# Patient Record
Sex: MALE | URBAN - NONMETROPOLITAN AREA
[De-identification: names, ages, dates, MRNs, and addresses within clinical notes are randomized per-mention and may not be internally consistent; named-entity substitution may affect disease eponyms.]

---

## 2023-07-13 ENCOUNTER — APPOINTMENT (RX ONLY)
Dept: URBAN - NONMETROPOLITAN AREA CLINIC 1 | Facility: CLINIC | Age: 53
Setting detail: DERMATOLOGY
End: 2023-07-13

## 2023-07-13 DIAGNOSIS — L21.8 OTHER SEBORRHEIC DERMATITIS: ICD-10-CM | Status: INADEQUATELY CONTROLLED

## 2023-07-13 DIAGNOSIS — L82.0 INFLAMED SEBORRHEIC KERATOSIS: ICD-10-CM | Status: INADEQUATELY CONTROLLED

## 2023-07-13 PROCEDURE — 17110 DESTRUCTION B9 LES UP TO 14: CPT

## 2023-07-13 PROCEDURE — ? MEDICATION COUNSELING

## 2023-07-13 PROCEDURE — 99204 OFFICE O/P NEW MOD 45 MIN: CPT | Mod: 25

## 2023-07-13 PROCEDURE — ? LIQUID NITROGEN

## 2023-07-13 PROCEDURE — ? PRESCRIPTION MEDICATION MANAGEMENT

## 2023-07-13 PROCEDURE — ? PRESCRIPTION

## 2023-07-13 PROCEDURE — ? COUNSELING

## 2023-07-13 RX ORDER — FLUOCINOLONE ACETONIDE 0.1 MG/ML
SOLUTION TOPICAL AS DIRECTED
Qty: 60 | Refills: 3 | Status: ERX | COMMUNITY
Start: 2023-07-13

## 2023-07-13 RX ORDER — KETOCONAZOLE 20 MG/ML
SHAMPOO, SUSPENSION TOPICAL AS DIRECTED
Qty: 120 | Refills: 3 | Status: ERX | COMMUNITY
Start: 2023-07-13

## 2023-07-13 RX ADMIN — FLUOCINOLONE ACETONIDE: 0.1 SOLUTION TOPICAL at 00:00

## 2023-07-13 RX ADMIN — KETOCONAZOLE: 20 SHAMPOO, SUSPENSION TOPICAL at 00:00

## 2023-07-13 ASSESSMENT — LOCATION DETAILED DESCRIPTION DERM
LOCATION DETAILED: RIGHT CENTRAL FRONTAL SCALP
LOCATION DETAILED: LEFT CENTRAL FRONTAL SCALP
LOCATION DETAILED: MID-OCCIPITAL SCALP

## 2023-07-13 ASSESSMENT — LOCATION ZONE DERM: LOCATION ZONE: SCALP

## 2023-07-13 ASSESSMENT — LOCATION SIMPLE DESCRIPTION DERM
LOCATION SIMPLE: RIGHT SCALP
LOCATION SIMPLE: LEFT SCALP
LOCATION SIMPLE: POSTERIOR SCALP

## 2023-07-13 NOTE — PROCEDURE: MEDICATION COUNSELING
Azithromycin Counseling:  I discussed with the patient the risks of azithromycin including but not limited to GI upset, allergic reaction, drug rash, diarrhea, and yeast infections. Nsaids Counseling: NSAID Counseling: I discussed with the patient that NSAIDs should be taken with food. Prolonged use of NSAIDs can result in the development of stomach ulcers.  Patient advised to stop taking NSAIDs if abdominal pain occurs.  The patient verbalized understanding of the proper use and possible adverse effects of NSAIDs.  All of the patient's questions and concerns were addressed.

## 2023-07-13 NOTE — PROCEDURE: PRESCRIPTION MEDICATION MANAGEMENT
Render In Strict Bullet Format?: No
Detail Level: Zone
Initiate Treatment: Ketoconazole shampoo apply to scalp once weekly leave on for 5 mins then rinse \\nFluocinolone solution apply to scalp bid for 2 weeks then bid weekends only

## 2023-07-13 NOTE — PROCEDURE: MEDICATION COUNSELING
Not Done Clofazimine Counseling:  I discussed with the patient the risks of clofazimine including but not limited to skin and eye pigmentation, liver damage, nausea/vomiting, gastrointestinal bleeding and allergy.

## 2023-07-13 NOTE — PROCEDURE: LIQUID NITROGEN
Include Z78.9 (Other Specified Conditions Influencing Health Status) As An Associated Diagnosis?: No
Post-Care Instructions: I reviewed with the patient in detail post-care instructions. Patient is to wear sunprotection, and avoid picking at any of the treated lesions. Pt may apply Vaseline to crusted or scabbing areas.
Show Topical Anesthesia Variable?: Yes
Spray Paint Text: The liquid nitrogen was applied to the skin utilizing a spray paint frosting technique.
Medical Necessity Clause: This procedure was medically necessary because the lesions that were treated were:
Medical Necessity Information: It is in your best interest to select a reason for this procedure from the list below. All of these items fulfill various CMS LCD requirements except the new and changing color options.
Detail Level: Detailed
Consent: The patient's consent was obtained including but not limited to risks of crusting, scabbing, blistering, scarring, darker or lighter pigmentary change, recurrence, incomplete removal and infection.

## 2023-07-13 NOTE — PROCEDURE: MEDICATION COUNSELING
naomi flores Propranolol Counseling:  I discussed with the patient the risks of propranolol including but not limited to low heart rate, low blood pressure, low blood sugar, restlessness and increased cold sensitivity. They should call the office if they experience any of these side effects.

## 2023-08-24 ENCOUNTER — APPOINTMENT (RX ONLY)
Dept: URBAN - NONMETROPOLITAN AREA CLINIC 1 | Facility: CLINIC | Age: 53
Setting detail: DERMATOLOGY
End: 2023-08-24

## 2023-08-24 ENCOUNTER — RX ONLY (OUTPATIENT)
Age: 53
Setting detail: RX ONLY
End: 2023-08-24

## 2023-08-24 DIAGNOSIS — L21.8 OTHER SEBORRHEIC DERMATITIS: ICD-10-CM

## 2023-08-24 DIAGNOSIS — L82.0 INFLAMED SEBORRHEIC KERATOSIS: ICD-10-CM | Status: INADEQUATELY CONTROLLED

## 2023-08-24 PROCEDURE — ? PRESCRIPTION MEDICATION MANAGEMENT

## 2023-08-24 PROCEDURE — 17110 DESTRUCTION B9 LES UP TO 14: CPT

## 2023-08-24 PROCEDURE — ? COUNSELING

## 2023-08-24 PROCEDURE — 99214 OFFICE O/P EST MOD 30 MIN: CPT | Mod: 25

## 2023-08-24 PROCEDURE — ? MEDICATION COUNSELING

## 2023-08-24 PROCEDURE — ? LIQUID NITROGEN

## 2023-08-24 RX ORDER — FLUOCINOLONE ACETONIDE 0.1 MG/ML
SOLUTION TOPICAL AS DIRECTED
Qty: 60 | Refills: 3 | Status: ERX

## 2023-08-24 ASSESSMENT — LOCATION DETAILED DESCRIPTION DERM
LOCATION DETAILED: RIGHT SUPERIOR PARIETAL SCALP
LOCATION DETAILED: LEFT SUPERIOR PARIETAL SCALP
LOCATION DETAILED: RIGHT CENTRAL PARIETAL SCALP

## 2023-08-24 ASSESSMENT — LOCATION SIMPLE DESCRIPTION DERM: LOCATION SIMPLE: SCALP

## 2023-08-24 ASSESSMENT — LOCATION ZONE DERM: LOCATION ZONE: SCALP

## 2023-08-24 NOTE — PROCEDURE: MEDICATION COUNSELING
Detail Level: Simple Niacinamide Pregnancy And Lactation Text: These medications are considered safe during pregnancy.

## 2023-08-24 NOTE — PROCEDURE: PRESCRIPTION MEDICATION MANAGEMENT
Continue Regimen: Ketoconazole Shampoo AA scalp once weekly
Render In Strict Bullet Format?: No
Detail Level: Zone
Initiate Treatment: Fluocinolone Solution AA scalp BID x 2 weeks then BID weekends only (patient did not receive from pharmacy)

## 2023-08-24 NOTE — PROCEDURE: LIQUID NITROGEN
Medical Necessity Clause: This procedure was medically necessary because the lesions that were treated were:
Medical Necessity Information: It is in your best interest to select a reason for this procedure from the list below. All of these items fulfill various CMS LCD requirements except the new and changing color options.
Consent: The patient's consent was obtained including but not limited to risks of crusting, scabbing, blistering, scarring, darker or lighter pigmentary change, recurrence, incomplete removal and infection.
Render Note In Bullet Format When Appropriate: No
Show Applicator Variable?: Yes
Detail Level: Detailed
Post-Care Instructions: I reviewed with the patient in detail post-care instructions. Patient is to wear sunprotection, and avoid picking at any of the treated lesions. Pt may apply Vaseline to crusted or scabbing areas.
Spray Paint Text: The liquid nitrogen was applied to the skin utilizing a spray paint frosting technique.

## 2023-10-09 ENCOUNTER — APPOINTMENT (RX ONLY)
Dept: URBAN - NONMETROPOLITAN AREA CLINIC 1 | Facility: CLINIC | Age: 53
Setting detail: DERMATOLOGY
End: 2023-10-09

## 2023-10-09 DIAGNOSIS — L21.8 OTHER SEBORRHEIC DERMATITIS: ICD-10-CM

## 2023-10-09 DIAGNOSIS — L82.0 INFLAMED SEBORRHEIC KERATOSIS: ICD-10-CM | Status: INADEQUATELY CONTROLLED

## 2023-10-09 PROCEDURE — ? MEDICATION COUNSELING

## 2023-10-09 PROCEDURE — ? PRESCRIPTION

## 2023-10-09 PROCEDURE — ? PRESCRIPTION MEDICATION MANAGEMENT

## 2023-10-09 PROCEDURE — ? COUNSELING

## 2023-10-09 PROCEDURE — 17110 DESTRUCTION B9 LES UP TO 14: CPT

## 2023-10-09 PROCEDURE — ? MDM - TREATMENT GOALS

## 2023-10-09 PROCEDURE — ? PHOTO-DOCUMENTATION

## 2023-10-09 PROCEDURE — ? LIQUID NITROGEN

## 2023-10-09 PROCEDURE — 99214 OFFICE O/P EST MOD 30 MIN: CPT | Mod: 25

## 2023-10-09 RX ORDER — CLOBETASOL PROPIONATE 0.5 MG/ML
SOLUTION TOPICAL
Qty: 50 | Refills: 1 | Status: ERX | COMMUNITY
Start: 2023-10-09

## 2023-10-09 RX ADMIN — CLOBETASOL PROPIONATE: 0.5 SOLUTION TOPICAL at 00:00

## 2023-10-09 ASSESSMENT — LOCATION SIMPLE DESCRIPTION DERM
LOCATION SIMPLE: LEFT SCALP
LOCATION SIMPLE: SCALP

## 2023-10-09 ASSESSMENT — LOCATION DETAILED DESCRIPTION DERM
LOCATION DETAILED: LEFT MEDIAL FRONTAL SCALP
LOCATION DETAILED: LEFT SUPERIOR PARIETAL SCALP

## 2023-10-09 ASSESSMENT — LOCATION ZONE DERM: LOCATION ZONE: SCALP

## 2023-10-09 NOTE — PROCEDURE: PRESCRIPTION MEDICATION MANAGEMENT
Discontinue Regimen: Fluocinolone oil Apply to scalp on weekends only
Render In Strict Bullet Format?: No
Detail Level: Zone
Initiate Treatment: Clobetasol scalp solution apply aa scalp bid then twice daily on weekends for maintenance
Continue Regimen: Ketoconazole 2% shampoo once weekly

## 2023-10-09 NOTE — PROCEDURE: LIQUID NITROGEN
Show Topical Anesthesia Variable?: Yes
Consent: The patient's consent was obtained including but not limited to risks of crusting, scabbing, blistering, scarring, darker or lighter pigmentary change, recurrence, incomplete removal and infection.
Include Z78.9 (Other Specified Conditions Influencing Health Status) As An Associated Diagnosis?: No
Medical Necessity Clause: This procedure was medically necessary because the lesions that were treated were:
Post-Care Instructions: I reviewed with the patient in detail post-care instructions. Patient is to wear sunprotection, and avoid picking at any of the treated lesions. Pt may apply Vaseline to crusted or scabbing areas.
Detail Level: Detailed
Spray Paint Text: The liquid nitrogen was applied to the skin utilizing a spray paint frosting technique.
Medical Necessity Information: It is in your best interest to select a reason for this procedure from the list below. All of these items fulfill various CMS LCD requirements except the new and changing color options.

## 2023-10-09 NOTE — PROCEDURE: MEDICATION COUNSELING
Clindamycin Counseling: I counseled the patient regarding use of clindamycin as an antibiotic for prophylactic and/or therapeutic purposes. Clindamycin is active against numerous classes of bacteria, including skin bacteria. Side effects may include nausea, diarrhea, gastrointestinal upset, rash, hives, yeast infections, and in rare cases, colitis. Cantharidin Pregnancy And Lactation Text: This medication has not been proven safe during pregnancy. It is unknown if this medication is excreted in breast milk.

## 2023-10-09 NOTE — PROCEDURE: MEDICATION COUNSELING
Discontinue and see if the muscle aches go away Dupixent Counseling: I discussed with the patient the risks of dupilumab including but not limited to eye infection and irritation, cold sores, injection site reactions, worsening of asthma, allergic reactions and increased risk of parasitic infection.  Live vaccines should be avoided while taking dupilumab. Dupilumab will also interact with certain medications such as warfarin and cyclosporine. The patient understands that monitoring is required and they must alert us or the primary physician if symptoms of infection or other concerning signs are noted.

## 2023-10-09 NOTE — PROCEDURE: MEDICATION COUNSELING
Initiate Treatment: imiquimod 5 % topical cream packet \\nSig: Apply thin layer to affected areas nightly for x2 weeks then stop x2 weeks. repeat as needed Detail Level: Zone Initiate Treatment: ciclopirox 0.77 % topical cream \\nSig: Apply to affected area on groin twice daily as needed Discontinue Regimen: Nystatin cream- due to loss of efficacy Topical Clindamycin Counseling: Patient counseled that this medication may cause skin irritation or allergic reactions.  In the event of skin irritation, the patient was advised to reduce the amount of the drug applied or use it less frequently.   The patient verbalized understanding of the proper use and possible adverse effects of clindamycin.  All of the patient's questions and concerns were addressed.

## 2024-07-17 ENCOUNTER — OFFICE VISIT (OUTPATIENT)
Age: 54
End: 2024-07-17

## 2024-07-17 VITALS
RESPIRATION RATE: 18 BRPM | SYSTOLIC BLOOD PRESSURE: 178 MMHG | DIASTOLIC BLOOD PRESSURE: 110 MMHG | HEART RATE: 84 BPM | TEMPERATURE: 98 F

## 2024-07-17 DIAGNOSIS — I10 PRIMARY HYPERTENSION: ICD-10-CM

## 2024-07-17 DIAGNOSIS — G51.8 NEURALGIC FACIAL PAIN: Primary | ICD-10-CM

## 2024-07-17 RX ORDER — LISINOPRIL 20 MG/1
20 TABLET ORAL DAILY
COMMUNITY

## 2024-07-17 ASSESSMENT — ENCOUNTER SYMPTOMS
WHEEZING: 0
SORE THROAT: 0
RHINORRHEA: 0
COLOR CHANGE: 0
TROUBLE SWALLOWING: 0
SHORTNESS OF BREATH: 0
PHOTOPHOBIA: 0
VOMITING: 0
SINUS PRESSURE: 0
FACIAL SWELLING: 0
SINUS PAIN: 0
CHEST TIGHTNESS: 0
NAUSEA: 0
EYE PAIN: 0

## 2024-07-17 ASSESSMENT — VISUAL ACUITY: OU: 1

## 2024-07-17 NOTE — PROGRESS NOTES
the extremities, difficulty speaking, or difficulty with ambulation/altered gait. Patient verbalized understanding.        Provided patient with information regarding the onsite occupational therapy services at the [facility], and educated on how to schedule and appointment if needed for reoccurring neck pain.   The occupational therapist is Keegan Kingston, OT, CHT, CIE, MS IH, and he comes twice a month between 9am and 12pm. To make an appointment you can email [email or web address] or go to the following link [email or web address])         I have reviewed the patient's medication list and past medical history and updated the patient record appropriately.    BRIAN Oneal NP

## 2024-07-19 ENCOUNTER — TELEPHONE (OUTPATIENT)
Age: 54
End: 2024-07-19

## 2024-07-19 NOTE — TELEPHONE ENCOUNTER
Called patient to follow up on symptoms - patient states he's still getting electric shock sensations on the right side of his face. Patient hasn't made an appointment to follow up with his primary care provider, Dr. Barahona. Patient is thinking he may want to establish care with a new primary care provider but would like some recommendations. Patient lives in [place] but would be willing to see a primary care provider in [place] as it would be close to his work.     Advised patient that many of his coworkers see [facility] and there is also providers from [facility] at [facility] by the [place] [facility] and Dr. Garrett's office. Explained that it can take about 2-3 months to get a new patient appointment with [facility] primary care providers.     Patient states he will try [facility]. Provided patient with their phone number ([phone]) and recommended he call and make an appointment. Instructed the patient to let this provider know if it will take a long time to get an appointment.     Advised patient that his blood pressure was elevated at his last visit on Wednesday 7/17/24 and instructed him to return to the clinic to have his blood pressure rechecked today before 3pm r sometime early next week.     Patient verbalized understanding and agreed with the above plan of care.    Advised patient to follow up with the [facility] and [facility] at [phone] if he needs anything else.     BRIAN Palencia - NP        BRIAN Palencia - NP

## 2024-11-21 PROBLEM — E66.812 CLASS 2 OBESITY DUE TO EXCESS CALORIES WITHOUT SERIOUS COMORBIDITY WITH BODY MASS INDEX (BMI) OF 38.0 TO 38.9 IN ADULT: Status: ACTIVE | Noted: 2021-08-31

## 2024-11-21 PROBLEM — L30.9 DERMATITIS: Status: ACTIVE | Noted: 2022-08-24

## 2024-11-21 PROBLEM — R73.03 PREDIABETES: Status: ACTIVE | Noted: 2020-04-28

## 2024-11-21 PROBLEM — K80.20 CALCULUS OF GALLBLADDER WITHOUT CHOLECYSTITIS WITHOUT OBSTRUCTION: Status: ACTIVE | Noted: 2020-04-28

## 2024-11-21 PROBLEM — R10.32 LEFT GROIN PAIN: Status: ACTIVE | Noted: 2021-08-31

## 2024-11-21 PROBLEM — N52.2 DRUG-INDUCED ERECTILE DYSFUNCTION: Status: ACTIVE | Noted: 2022-06-29

## 2024-11-21 PROBLEM — E78.49 OTHER HYPERLIPIDEMIA: Status: ACTIVE | Noted: 2021-10-13

## 2024-11-21 PROBLEM — I10 HYPERTENSION: Status: ACTIVE | Noted: 2024-11-21

## 2024-11-21 PROBLEM — Z90.49 HISTORY OF COLECTOMY: Status: ACTIVE | Noted: 2020-02-26

## 2024-11-21 PROBLEM — E66.09 CLASS 2 OBESITY DUE TO EXCESS CALORIES WITHOUT SERIOUS COMORBIDITY WITH BODY MASS INDEX (BMI) OF 38.0 TO 38.9 IN ADULT: Status: ACTIVE | Noted: 2021-08-31

## 2024-11-27 ENCOUNTER — OFFICE VISIT (OUTPATIENT)
Age: 54
End: 2024-11-27

## 2024-11-27 VITALS
DIASTOLIC BLOOD PRESSURE: 85 MMHG | RESPIRATION RATE: 16 BRPM | TEMPERATURE: 98.5 F | OXYGEN SATURATION: 98 % | SYSTOLIC BLOOD PRESSURE: 120 MMHG | HEART RATE: 86 BPM

## 2024-11-27 DIAGNOSIS — Z13.220 ENCOUNTER FOR SCREENING FOR LIPID DISORDER: ICD-10-CM

## 2024-11-27 DIAGNOSIS — I10 PRIMARY HYPERTENSION: Primary | ICD-10-CM

## 2024-11-27 DIAGNOSIS — Z13.228 SCREENING FOR METABOLIC DISORDER: ICD-10-CM

## 2024-11-27 DIAGNOSIS — Z13.1 ENCOUNTER FOR SCREENING FOR DIABETES MELLITUS: ICD-10-CM

## 2024-11-27 DIAGNOSIS — Z00.00 ENCOUNTER FOR PREVENTIVE CARE: ICD-10-CM

## 2024-11-27 DIAGNOSIS — Z13.29 SCREENING FOR THYROID DISORDER: ICD-10-CM

## 2024-11-27 ASSESSMENT — ENCOUNTER SYMPTOMS
SHORTNESS OF BREATH: 0
CHEST TIGHTNESS: 0

## 2024-11-27 NOTE — PROGRESS NOTES
PROGRESS NOTE    SUBJECTIVE     Maxime Zazueta is a 54 y.o. male seen for:    Chief Complaint    Labwork         Client presents to the [facility] at his employer at [employer] for labwork. He has primary hypertension and takes lisinopril 40mg for hypertension.  He reports that he is fasting and here to get his lab work drawn. He reports that his blood pressure is controlled with his medication and he takes it as prescribed. He denies any chest pain, shortness of breath or difficulty breathing. He denies any other health issues or concerns at this time.           Patient presents to the [facility] for labwork. He has hypertension and takes     Patient last went to his primary care provider in September. He recently completed a Cologuard test for colon cancer screening. Patient has/has not had blood work done recently.     Current Outpatient Medications   Medication Sig Dispense Refill    sildenafil (VIAGRA) 100 MG tablet Take 1 tablet by mouth as needed      lisinopril (PRINIVIL;ZESTRIL) 40 MG tablet Take 0.5 tablets by mouth daily       No current facility-administered medications for this visit.      No Known Allergies  Past Medical History:   Diagnosis Date    Hypertension      History reviewed. No pertinent surgical history.    Social History     Tobacco Use    Smoking status: Never    Smokeless tobacco: Never   Substance Use Topics    Alcohol use: Never        History reviewed. No pertinent family history.    Review of Systems   Constitutional: Negative.    Respiratory:  Negative for chest tightness and shortness of breath.    Cardiovascular:  Negative for chest pain and palpitations.      OBJECTIVE    /85 (Site: Left Upper Arm, Position: Sitting, Cuff Size: Large Adult)   Pulse 86   Temp 98.5 °F (36.9 °C) (Oral)   Resp 16   SpO2 98%      Physical Exam  Constitutional:       General: He is not in acute distress.     Appearance: Normal appearance. He is normal weight. He is

## 2024-11-28 LAB
ALBUMIN SERPL-MCNC: 4.1 G/DL (ref 3.8–4.9)
ALP SERPL-CCNC: 67 IU/L (ref 44–121)
ALT SERPL-CCNC: 36 IU/L (ref 0–44)
AST SERPL-CCNC: 26 IU/L (ref 0–40)
BASOPHILS # BLD AUTO: 0.1 X10E3/UL (ref 0–0.2)
BASOPHILS NFR BLD AUTO: 1 %
BILIRUB SERPL-MCNC: 0.5 MG/DL (ref 0–1.2)
BUN SERPL-MCNC: 10 MG/DL (ref 6–24)
BUN/CREAT SERPL: 10 (ref 9–20)
CALCIUM SERPL-MCNC: 9.4 MG/DL (ref 8.7–10.2)
CHLORIDE SERPL-SCNC: 101 MMOL/L (ref 96–106)
CHOLEST SERPL-MCNC: 184 MG/DL (ref 100–199)
CHOLEST/HDLC SERPL: 3.2 RATIO (ref 0–5)
CO2 SERPL-SCNC: 25 MMOL/L (ref 20–29)
CREAT SERPL-MCNC: 0.99 MG/DL (ref 0.76–1.27)
EGFRCR SERPLBLD CKD-EPI 2021: 90 ML/MIN/1.73
EOSINOPHIL # BLD AUTO: 0.1 X10E3/UL (ref 0–0.4)
EOSINOPHIL NFR BLD AUTO: 2 %
ERYTHROCYTE [DISTWIDTH] IN BLOOD BY AUTOMATED COUNT: 12.8 % (ref 11.6–15.4)
GLOBULIN SER CALC-MCNC: 2.2 G/DL (ref 1.5–4.5)
GLUCOSE SERPL-MCNC: 96 MG/DL (ref 70–99)
HBA1C MFR BLD: 6.1 % (ref 4.8–5.6)
HCT VFR BLD AUTO: 47.4 % (ref 37.5–51)
HDLC SERPL-MCNC: 57 MG/DL
HGB BLD-MCNC: 15.4 G/DL (ref 13–17.7)
IMM GRANULOCYTES # BLD AUTO: 0 X10E3/UL (ref 0–0.1)
IMM GRANULOCYTES NFR BLD AUTO: 0 %
LDLC SERPL CALC-MCNC: 104 MG/DL (ref 0–99)
LYMPHOCYTES # BLD AUTO: 1.6 X10E3/UL (ref 0.7–3.1)
LYMPHOCYTES NFR BLD AUTO: 27 %
MCH RBC QN AUTO: 31.4 PG (ref 26.6–33)
MCHC RBC AUTO-ENTMCNC: 32.5 G/DL (ref 31.5–35.7)
MCV RBC AUTO: 97 FL (ref 79–97)
MONOCYTES # BLD AUTO: 0.7 X10E3/UL (ref 0.1–0.9)
MONOCYTES NFR BLD AUTO: 12 %
NEUTROPHILS # BLD AUTO: 3.5 X10E3/UL (ref 1.4–7)
NEUTROPHILS NFR BLD AUTO: 58 %
NT-PROBNP SERPL-MCNC: <36 PG/ML (ref 0–210)
PLATELET # BLD AUTO: 225 X10E3/UL (ref 150–450)
POTASSIUM SERPL-SCNC: 5.5 MMOL/L (ref 3.5–5.2)
PROT SERPL-MCNC: 6.3 G/DL (ref 6–8.5)
RBC # BLD AUTO: 4.91 X10E6/UL (ref 4.14–5.8)
SODIUM SERPL-SCNC: 139 MMOL/L (ref 134–144)
TRIGL SERPL-MCNC: 130 MG/DL (ref 0–149)
TSH SERPL DL<=0.005 MIU/L-ACNC: 0.45 UIU/ML (ref 0.45–4.5)
VLDLC SERPL CALC-MCNC: 23 MG/DL (ref 5–40)
WBC # BLD AUTO: 6.1 X10E3/UL (ref 3.4–10.8)

## 2024-12-02 ENCOUNTER — TELEPHONE (OUTPATIENT)
Age: 54
End: 2024-12-02

## 2024-12-02 NOTE — TELEPHONE ENCOUNTER
Called patient to report his test results from blood work taken at the [facility] on Wednesday 11/27/24. Advised patient that the results were grossly normal with his hemoglobin A1C improved from 6.3% to 6.1% but his potassium level had increased to 5.5 mmol/L. (See results below.) Discussed recent healthy lifestyle changes the patient has made, including juicing oranges and apples and other vegetables as well as eating a banana every night. Patient states he did not increase his lisinopril to 40 mg as instructed at his last doctor's visit because he felt his blood pressure was not as elevated as they thought. Patient has been cutting the 40 mg tablets in half and taking one per day.     Recent Results (from the past 168 hour(s))   CBC with Auto Differential    Collection Time: 11/27/24  8:30 AM   Result Value Ref Range    WBC 6.1 3.4 - 10.8 x10E3/uL    RBC 4.91 4.14 - 5.80 x10E6/uL    Hemoglobin 15.4 13.0 - 17.7 g/dL    Hematocrit 47.4 37.5 - 51.0 %    MCV 97 79 - 97 fL    MCH 31.4 26.6 - 33.0 pg    MCHC 32.5 31.5 - 35.7 g/dL    RDW 12.8 11.6 - 15.4 %    Platelets 225 150 - 450 x10E3/uL    Neutrophils % 58 Not Estab. %    Lymphocytes % 27 Not Estab. %    Monocytes % 12 Not Estab. %    Eosinophils % 2 Not Estab. %    Basophils % 1 Not Estab. %    Neutrophils Absolute 3.5 1.4 - 7.0 x10E3/uL    Lymphocytes Absolute 1.6 0.7 - 3.1 x10E3/uL    Monocytes Absolute 0.7 0.1 - 0.9 x10E3/uL    Eosinophils Absolute 0.1 0.0 - 0.4 x10E3/uL    Basophils Absolute 0.1 0.0 - 0.2 x10E3/uL    Immature Granulocytes % 0 Not Estab. %    Immature Grans (Abs) 0.0 0.0 - 0.1 x10E3/uL   Comprehensive Metabolic Panel    Collection Time: 11/27/24  8:30 AM   Result Value Ref Range    Glucose 96 70 - 99 mg/dL    BUN 10 6 - 24 mg/dL    Creatinine 0.99 0.76 - 1.27 mg/dL    Est, Glom Filt Rate 90 >59 mL/min/1.73    BUN/Creatinine Ratio 10 9 - 20    Sodium 139 134 - 144 mmol/L    Potassium 5.5 (H) 3.5 - 5.2 mmol/L    Chloride

## 2025-07-08 NOTE — PROCEDURE: MEDICATION COUNSELING
Photo Preface (Leave Blank If You Do Not Want): Photographs were obtained today, to identify location and/or because the cpatient claims the area(s)  \"keep worsening\" Detail Level: Zone Xolair Pregnancy And Lactation Text: This medication is Pregnancy Category B and is considered safe during pregnancy. This medication is excreted in breast milk.

## 2025-09-02 ENCOUNTER — OFFICE VISIT (OUTPATIENT)
Age: 55
End: 2025-09-02

## 2025-09-02 DIAGNOSIS — Z01.10 ENCOUNTER FOR HEARING EXAMINATION WITHOUT ABNORMAL FINDINGS: Primary | ICD-10-CM
